# Patient Record
Sex: FEMALE | Race: WHITE | NOT HISPANIC OR LATINO | URBAN - METROPOLITAN AREA
[De-identification: names, ages, dates, MRNs, and addresses within clinical notes are randomized per-mention and may not be internally consistent; named-entity substitution may affect disease eponyms.]

---

## 2017-10-12 ENCOUNTER — APPOINTMENT (RX ONLY)
Dept: URBAN - METROPOLITAN AREA CLINIC 74 | Facility: CLINIC | Age: 37
Setting detail: DERMATOLOGY
End: 2017-10-12

## 2017-10-12 DIAGNOSIS — Z41.9 ENCOUNTER FOR PROCEDURE FOR PURPOSES OTHER THAN REMEDYING HEALTH STATE, UNSPECIFIED: ICD-10-CM

## 2017-10-12 PROBLEM — L70.0 ACNE VULGARIS: Status: ACTIVE | Noted: 2017-10-12

## 2017-10-12 PROBLEM — F41.9 ANXIETY DISORDER, UNSPECIFIED: Status: ACTIVE | Noted: 2017-10-12

## 2017-10-12 PROCEDURE — ? COSMETIC CONSULTATION: CHEMICAL PEELS

## 2017-10-12 PROCEDURE — ? FILLERS

## 2017-10-12 PROCEDURE — ? COSMETIC CONSULTATION: FILLERS

## 2017-10-12 PROCEDURE — ? BOTOX (U OR CC)

## 2017-10-12 ASSESSMENT — LOCATION DETAILED DESCRIPTION DERM
LOCATION DETAILED: RIGHT INFERIOR MEDIAL MALAR CHEEK
LOCATION DETAILED: LEFT INFERIOR MEDIAL MALAR CHEEK

## 2017-10-12 ASSESSMENT — LOCATION ZONE DERM: LOCATION ZONE: FACE

## 2017-10-12 ASSESSMENT — LOCATION SIMPLE DESCRIPTION DERM
LOCATION SIMPLE: LEFT CHEEK
LOCATION SIMPLE: RIGHT CHEEK

## 2017-10-12 NOTE — PROCEDURE: FILLERS
Additional Area 5 Volume In Cc: 0
Anesthesia Volume In Cc: 0.5
Additional Anesthesia Volume In Cc: 6
Additional Area 1 Location: upper cheeks
Price (Use Numbers Only, No Special Characters Or $): 8161
Additional Area 1 Volume In Cc: 1
Expiration Date (Month Year): 2018-04-15
Post-Care Instructions: Patient instructed to apply ice to reduce swelling.
Use Map Statement For Sites (Optional): No
Lot #: [id number]
Consent: Written consent obtained. Risks include but not limited to bruising, beading, irregular texture, ulceration, infection, allergic reaction, scar formation, incomplete augmentation, temporary nature, procedural pain.
Lot #: [id number]
Filler: Voluma
Map Statment: See Attach Map for Complete Details
Filler: Juvederm Ultra Plus XC
Detail Level: Detailed
Expiration Date (Month Year): 2017-07-26

## 2017-10-12 NOTE — PROCEDURE: BOTOX (U OR CC)
[place] Units: 0
Document As Units Or Cc?: units
Expiration Date (Month Year): Jan 2020
Topical Anesthesia?: 20% benzocaine, 8% lidocaine, 4% tetracaine
Glabellar Complex Units: 16
Length Of Topical Anesthesia Application (Optional): 30 minutes
Post-Care Instructions: Patient instructed to not lie down for 4 hours and limit physical activity for 24 hours. Patient instructed not to travel by airplane for 48 hours.
Dilution (U/0.1 Cc): 4
Detail Level: Detailed
Consent: Written consent obtained. Risks include but not limited to lid/brow ptosis, bruising, swelling, diplopia, temporary effect, incomplete chemical denervation.
Lot #: [id number]
Forehead Units/Cc: 6
Additional Area 2 Location: lateral brows
Price (Use Numbers Only, No Special Characters Or $): 062
Additional Area 1 Location: crows feet